# Patient Record
Sex: FEMALE | Race: WHITE | NOT HISPANIC OR LATINO
[De-identification: names, ages, dates, MRNs, and addresses within clinical notes are randomized per-mention and may not be internally consistent; named-entity substitution may affect disease eponyms.]

---

## 2023-06-05 ENCOUNTER — NON-APPOINTMENT (OUTPATIENT)
Age: 23
End: 2023-06-05

## 2023-06-05 ENCOUNTER — APPOINTMENT (OUTPATIENT)
Dept: FAMILY MEDICINE | Facility: CLINIC | Age: 23
End: 2023-06-05
Payer: COMMERCIAL

## 2023-06-05 VITALS
HEIGHT: 66 IN | HEART RATE: 88 BPM | BODY MASS INDEX: 27.48 KG/M2 | WEIGHT: 171 LBS | DIASTOLIC BLOOD PRESSURE: 82 MMHG | SYSTOLIC BLOOD PRESSURE: 128 MMHG | OXYGEN SATURATION: 98 %

## 2023-06-05 DIAGNOSIS — M54.50 LOW BACK PAIN, UNSPECIFIED: ICD-10-CM

## 2023-06-05 DIAGNOSIS — F12.90 CANNABIS USE, UNSPECIFIED, UNCOMPLICATED: ICD-10-CM

## 2023-06-05 DIAGNOSIS — Z82.49 FAMILY HISTORY OF ISCHEMIC HEART DISEASE AND OTHER DISEASES OF THE CIRCULATORY SYSTEM: ICD-10-CM

## 2023-06-05 DIAGNOSIS — J30.9 ALLERGIC RHINITIS, UNSPECIFIED: ICD-10-CM

## 2023-06-05 DIAGNOSIS — G89.29 LOW BACK PAIN, UNSPECIFIED: ICD-10-CM

## 2023-06-05 DIAGNOSIS — Z80.0 FAMILY HISTORY OF MALIGNANT NEOPLASM OF DIGESTIVE ORGANS: ICD-10-CM

## 2023-06-05 PROCEDURE — 99205 OFFICE O/P NEW HI 60 MIN: CPT

## 2023-06-05 RX ORDER — FLUTICASONE PROPIONATE 50 UG/1
50 SPRAY, METERED NASAL
Refills: 0 | Status: ACTIVE | COMMUNITY

## 2023-06-05 RX ORDER — FEXOFENADINE HCL 180 MG
180 TABLET ORAL
Refills: 0 | Status: ACTIVE | COMMUNITY

## 2023-06-05 NOTE — HISTORY OF PRESENT ILLNESS
[Parent] : parent [FreeTextEntry1] : Establish care and continue HRT [de-identified] : Anthony\par he/him\par Father, Vee, present at visit as well\par Came out as trans recently.  Behind schedule in transitioning from pediatrician and finding an adult doctor.\par Today on third dose of T.\par Meds/accessories include:  \par BD 3 ml syringe 18g 1.5"\par BD 1ml luer jacqui syringe\par BD needles 25g x 5/8 inch\par levono-e estrad 0.15/0.03/0.01 tabs\par Test cyp 200mg/ml\par 0.2ml weekly.  Last injection was today.\par Sertraline 50mg tab.  Had been on escitalopram from 2020 to last year.  Seemed to stop working--hard to get up in the morning.  Then switched in 12/2022 to sertraline.  Feels much better.   Sees Dr. August at [facility].  Has appt with new psychologist.\par Uses Allegra and Flonase prn for allergies.  Works well.\par Uses medical cannabis for back pain, depression, anxiety.\par Recognized gender difference around age 8-12.  Friend at [employer] came out as trans.  They all sat down and talked about Gina who is now Karan.  Told father that he didn't want to be a girl anymore.  Had clothing change in Spring 2021.  More masculine clothes . Identified as lesbian.  Then Sophomore year cut hair to short. Came out as non binary and was they/them.  Then starting going by Anthony in Nov 2022.  Conversation on names helped crystalize gender.  In the last few months, met friends who are gender non conforming.\par Last month, came out on birthday for a gender reveal party.\par Knew that he wanted top surgery.  Started raising money in Dec 2022.\par Wanted to appear more as man.  Body dysmorphia.  Frustrated at being misgendered.\par Magic wand: would change all organs in body to male organ.  Have a penis.  No breasts.\par Gets dysphoria mostly from breasts.  Binding doesn't fully compress. \par Wants to do chest surgery over December break.  Hasn't thought so much about bottom surgery (less dysphoria from groin).\par Likes T injections.  Only been three months.  Getting some acne.  No other negative changes.\par  [address] is pharmacy.\par

## 2023-06-15 ENCOUNTER — APPOINTMENT (OUTPATIENT)
Dept: INTERNAL MEDICINE | Facility: CLINIC | Age: 23
End: 2023-06-15
Payer: COMMERCIAL

## 2023-06-15 PROCEDURE — XXXXX: CPT | Mod: 1L

## 2023-06-30 LAB
ALBUMIN SERPL ELPH-MCNC: 4.7 G/DL
ALP BLD-CCNC: 76 U/L
ALT SERPL-CCNC: 13 U/L
ANION GAP SERPL CALC-SCNC: 14 MMOL/L
AST SERPL-CCNC: 16 U/L
BILIRUB SERPL-MCNC: 0.3 MG/DL
BUN SERPL-MCNC: 10 MG/DL
CALCIUM SERPL-MCNC: 9.8 MG/DL
CHLORIDE SERPL-SCNC: 108 MMOL/L
CHOLEST SERPL-MCNC: 165 MG/DL
CO2 SERPL-SCNC: 21 MMOL/L
CREAT SERPL-MCNC: 0.68 MG/DL
EGFR: 127 ML/MIN/1.73M2
ESTIMATED AVERAGE GLUCOSE: 114 MG/DL
ESTRADIOL SERPL-MCNC: 26 PG/ML
GLUCOSE SERPL-MCNC: 91 MG/DL
HBA1C MFR BLD HPLC: 5.6 %
HDLC SERPL-MCNC: 62 MG/DL
LDLC SERPL CALC-MCNC: 87 MG/DL
NONHDLC SERPL-MCNC: 103 MG/DL
POTASSIUM SERPL-SCNC: 4.4 MMOL/L
PROT SERPL-MCNC: 7.2 G/DL
SODIUM SERPL-SCNC: 142 MMOL/L
TESTOST FREE SERPL-MCNC: 6.8 PG/ML
TESTOST SERPL-MCNC: 179 NG/DL
TRIGL SERPL-MCNC: 80 MG/DL
TSH SERPL-ACNC: 2.22 UIU/ML

## 2023-08-03 ENCOUNTER — LABORATORY RESULT (OUTPATIENT)
Age: 23
End: 2023-08-03

## 2023-08-03 ENCOUNTER — APPOINTMENT (OUTPATIENT)
Dept: INTERNAL MEDICINE | Facility: CLINIC | Age: 23
End: 2023-08-03
Payer: COMMERCIAL

## 2023-08-03 PROCEDURE — XXXXX: CPT | Mod: 1L

## 2023-08-18 ENCOUNTER — APPOINTMENT (OUTPATIENT)
Dept: INTERNAL MEDICINE | Facility: CLINIC | Age: 23
End: 2023-08-18

## 2023-09-01 ENCOUNTER — APPOINTMENT (OUTPATIENT)
Dept: INTERNAL MEDICINE | Facility: CLINIC | Age: 23
End: 2023-09-01

## 2023-09-06 LAB
TESTOST FREE SERPL-MCNC: 14.2 PG/ML
TESTOST SERPL-MCNC: 502 NG/DL

## 2023-09-15 ENCOUNTER — APPOINTMENT (OUTPATIENT)
Dept: FAMILY MEDICINE | Facility: CLINIC | Age: 23
End: 2023-09-15
Payer: COMMERCIAL

## 2023-09-15 VITALS
BODY MASS INDEX: 28.28 KG/M2 | HEIGHT: 66 IN | OXYGEN SATURATION: 98 % | HEART RATE: 70 BPM | SYSTOLIC BLOOD PRESSURE: 100 MMHG | WEIGHT: 176 LBS | DIASTOLIC BLOOD PRESSURE: 78 MMHG

## 2023-09-15 PROCEDURE — 99214 OFFICE O/P EST MOD 30 MIN: CPT

## 2023-11-03 RX ORDER — ISOPROPYL ALCOHOL 700 MG/ML
70 CLOTH TOPICAL
Qty: 100 | Refills: 11 | Status: ACTIVE | COMMUNITY
Start: 2023-11-03 | End: 1900-01-01

## 2023-11-03 RX ORDER — NEEDLES, DISPOSABLE 25GX5/8"
18G X 1" NEEDLE, DISPOSABLE MISCELLANEOUS
Qty: 20 | Refills: 11 | Status: ACTIVE | COMMUNITY
Start: 1900-01-01 | End: 1900-01-01

## 2023-11-03 RX ORDER — SYRINGE, DISPOSABLE, 1 ML
1 ML SYRINGE, EMPTY DISPOSABLE MISCELLANEOUS
Qty: 20 | Refills: 11 | Status: ACTIVE | COMMUNITY
Start: 1900-01-01 | End: 1900-01-01

## 2023-11-22 ENCOUNTER — RX RENEWAL (OUTPATIENT)
Age: 23
End: 2023-11-22

## 2024-01-04 ENCOUNTER — APPOINTMENT (OUTPATIENT)
Dept: FAMILY MEDICINE | Facility: CLINIC | Age: 24
End: 2024-01-04
Payer: COMMERCIAL

## 2024-01-04 VITALS
BODY MASS INDEX: 28.53 KG/M2 | DIASTOLIC BLOOD PRESSURE: 76 MMHG | OXYGEN SATURATION: 98 % | SYSTOLIC BLOOD PRESSURE: 124 MMHG | WEIGHT: 177.5 LBS | HEART RATE: 74 BPM | HEIGHT: 66 IN

## 2024-01-04 PROCEDURE — 36415 COLL VENOUS BLD VENIPUNCTURE: CPT

## 2024-01-04 PROCEDURE — 99214 OFFICE O/P EST MOD 30 MIN: CPT | Mod: 25

## 2024-01-04 PROCEDURE — G2211 COMPLEX E/M VISIT ADD ON: CPT

## 2024-01-04 RX ORDER — BENZOYL PEROXIDE 5 G/100G
5 GEL TOPICAL DAILY
Qty: 1 | Refills: 11 | Status: ACTIVE | COMMUNITY
Start: 2024-01-04 | End: 1900-01-01

## 2024-01-04 NOTE — HISTORY OF PRESENT ILLNESS
[FreeTextEntry1] : Follow up on med [de-identified] : Anthony He/him Pt here for follow up.  Needs T refill. Using T.  Seeing positive changes.  More exercise--goes to gym five times a week.  Helps with mental health.  Weights and some cardio. Some facial hair at sideburns and under chin and mustache.  Voice has dropped a lot.  Body contouring has changed.  Good muscle growth.  No bleeding from down below. Getting acne.  Using otc products.  Mostly on face.  Sometimes on shoulders.  No injection site problems except once got some bruising. Interested in upping dose of the T.  Last injection was 3 days ago.  Once a week.  No missed shots in the last month.

## 2024-01-04 NOTE — PHYSICAL EXAM
[Normal] : no rash [de-identified] : Some scattered acne on face; some hair growth on upper lip and sideburns

## 2024-01-12 LAB
ALBUMIN SERPL ELPH-MCNC: 5 G/DL
ALP BLD-CCNC: 76 U/L
ALT SERPL-CCNC: 18 U/L
ANION GAP SERPL CALC-SCNC: 13 MMOL/L
AST SERPL-CCNC: 24 U/L
BASOPHILS # BLD AUTO: 0.05 K/UL
BASOPHILS NFR BLD AUTO: 0.8 %
BILIRUB SERPL-MCNC: 0.8 MG/DL
BUN SERPL-MCNC: 10 MG/DL
CALCIUM SERPL-MCNC: 9.8 MG/DL
CHLORIDE SERPL-SCNC: 103 MMOL/L
CO2 SERPL-SCNC: 25 MMOL/L
CREAT SERPL-MCNC: 0.75 MG/DL
EGFR: 116 ML/MIN/1.73M2
EOSINOPHIL # BLD AUTO: 0.14 K/UL
EOSINOPHIL NFR BLD AUTO: 2.3 %
ESTRADIOL SERPL-MCNC: 39 PG/ML
GLUCOSE SERPL-MCNC: 87 MG/DL
HCT VFR BLD CALC: 47.6 %
HGB BLD-MCNC: 14.7 G/DL
IMM GRANULOCYTES NFR BLD AUTO: 0.3 %
LYMPHOCYTES # BLD AUTO: 2.32 K/UL
LYMPHOCYTES NFR BLD AUTO: 37.7 %
MAN DIFF?: NORMAL
MCHC RBC-ENTMCNC: 25.4 PG
MCHC RBC-ENTMCNC: 30.9 GM/DL
MCV RBC AUTO: 82.2 FL
MONOCYTES # BLD AUTO: 0.46 K/UL
MONOCYTES NFR BLD AUTO: 7.5 %
NEUTROPHILS # BLD AUTO: 3.17 K/UL
NEUTROPHILS NFR BLD AUTO: 51.4 %
PLATELET # BLD AUTO: 363 K/UL
POTASSIUM SERPL-SCNC: 4.3 MMOL/L
PROT SERPL-MCNC: 7.2 G/DL
RBC # BLD: 5.79 M/UL
RBC # FLD: 15 %
SODIUM SERPL-SCNC: 141 MMOL/L
TESTOST FREE SERPL-MCNC: 11.6 PG/ML
TESTOST SERPL-MCNC: 380 NG/DL
WBC # FLD AUTO: 6.16 K/UL

## 2024-02-06 ENCOUNTER — RX RENEWAL (OUTPATIENT)
Age: 24
End: 2024-02-06

## 2024-03-07 ENCOUNTER — APPOINTMENT (OUTPATIENT)
Dept: FAMILY MEDICINE | Facility: CLINIC | Age: 24
End: 2024-03-07
Payer: COMMERCIAL

## 2024-03-07 VITALS
HEART RATE: 68 BPM | OXYGEN SATURATION: 97 % | WEIGHT: 179 LBS | BODY MASS INDEX: 28.77 KG/M2 | SYSTOLIC BLOOD PRESSURE: 120 MMHG | DIASTOLIC BLOOD PRESSURE: 70 MMHG | HEIGHT: 66 IN

## 2024-03-07 DIAGNOSIS — L70.0 ACNE VULGARIS: ICD-10-CM

## 2024-03-07 DIAGNOSIS — F64.9 GENDER IDENTITY DISORDER, UNSPECIFIED: ICD-10-CM

## 2024-03-07 PROCEDURE — G2211 COMPLEX E/M VISIT ADD ON: CPT

## 2024-03-07 PROCEDURE — 36415 COLL VENOUS BLD VENIPUNCTURE: CPT

## 2024-03-07 PROCEDURE — 99214 OFFICE O/P EST MOD 30 MIN: CPT

## 2024-03-07 RX ORDER — DOXYCYCLINE 100 MG/1
100 CAPSULE ORAL
Qty: 90 | Refills: 1 | Status: ACTIVE | COMMUNITY
Start: 2024-03-07 | End: 1900-01-01

## 2024-03-07 RX ORDER — NEEDLES, DISPOSABLE 25GX5/8"
25G X 5/8" NEEDLE, DISPOSABLE MISCELLANEOUS
Qty: 20 | Refills: 11 | Status: ACTIVE | COMMUNITY
Start: 1900-01-01 | End: 1900-01-01

## 2024-03-07 NOTE — HISTORY OF PRESENT ILLNESS
[de-identified] : Anthony He/him At college right now.  Very busy.  Final year and lots of classes, jobs, and projects.  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  Getting masters in communication.  Working for [employer] team. Health has been good. Facial hair growing much faster.  Learning how to shave it. Acne has been better and worse.  Worse at end of T cycle.  Using topical  Increased T from 0.3 to 0.4ml.  Doesn't necessarily feel any different in any bad way.  Likes the dose.  Hungrier.  Goes to gym frequently.  Weight about the same but clearly more muscle.  Happy with dose.  Feels like good masculinization Got COVID and flu shot in the fall. Taking sertraline.  Feels really stable.  Likes mood.  Missed a pill and felt negative.  Wants to continue on current dose.   [FreeTextEntry1] : Follow up on meds

## 2024-03-07 NOTE — HEALTH RISK ASSESSMENT
[1 or 2 (0 pts)] : 1 or 2 (0 points) [2 - 4 times a month (2 pts)] : 2-4 times a month (2 points) [Yes] : In the past 12 months have you used drugs other than those required for medical reasons? Yes [Never (0 pts)] : Never (0 points) [No falls in past year] : Patient reported no falls in the past year [0] : 2) Feeling down, depressed, or hopeless: Not at all (0) [Never] : Never [de-identified] : medical [place] [de-identified] : gym 5 days a week [de-identified] : normal

## 2024-03-07 NOTE — PHYSICAL EXAM
[Normal] : no carotid or abdominal bruits heard, no varicosities, pedal pulses are present, no peripheral edema, no extremity clubbing or cyanosis and no palpable aorta [de-identified] : Macular acne across face, upper back, and upper arms

## 2024-03-12 LAB
ALBUMIN SERPL ELPH-MCNC: 4.6 G/DL
ALP BLD-CCNC: 76 U/L
ALT SERPL-CCNC: 16 U/L
ANION GAP SERPL CALC-SCNC: 14 MMOL/L
AST SERPL-CCNC: 17 U/L
BILIRUB SERPL-MCNC: 0.6 MG/DL
BUN SERPL-MCNC: 9 MG/DL
CALCIUM SERPL-MCNC: 9.5 MG/DL
CHLORIDE SERPL-SCNC: 104 MMOL/L
CO2 SERPL-SCNC: 23 MMOL/L
CREAT SERPL-MCNC: 0.74 MG/DL
EGFR: 118 ML/MIN/1.73M2
ESTRADIOL SERPL-MCNC: 32 PG/ML
GLUCOSE SERPL-MCNC: 86 MG/DL
HCT VFR BLD CALC: 48.8 %
HGB BLD-MCNC: 15.3 G/DL
MCHC RBC-ENTMCNC: 26.1 PG
MCHC RBC-ENTMCNC: 31.4 GM/DL
MCV RBC AUTO: 83.1 FL
PLATELET # BLD AUTO: 356 K/UL
POTASSIUM SERPL-SCNC: 4.8 MMOL/L
PROT SERPL-MCNC: 6.9 G/DL
RBC # BLD: 5.87 M/UL
RBC # FLD: 14 %
SODIUM SERPL-SCNC: 141 MMOL/L
TESTOST SERPL-MCNC: 494 NG/DL
WBC # FLD AUTO: 5.95 K/UL

## 2024-06-07 ENCOUNTER — APPOINTMENT (OUTPATIENT)
Dept: FAMILY MEDICINE | Facility: CLINIC | Age: 24
End: 2024-06-07
Payer: COMMERCIAL

## 2024-06-07 VITALS
TEMPERATURE: 97.9 F | HEIGHT: 66 IN | WEIGHT: 173 LBS | DIASTOLIC BLOOD PRESSURE: 88 MMHG | SYSTOLIC BLOOD PRESSURE: 128 MMHG | BODY MASS INDEX: 27.8 KG/M2

## 2024-06-07 DIAGNOSIS — E34.9 ENDOCRINE DISORDER, UNSPECIFIED: ICD-10-CM

## 2024-06-07 DIAGNOSIS — F41.9 ANXIETY DISORDER, UNSPECIFIED: ICD-10-CM

## 2024-06-07 DIAGNOSIS — F32.A ANXIETY DISORDER, UNSPECIFIED: ICD-10-CM

## 2024-06-07 PROCEDURE — G2211 COMPLEX E/M VISIT ADD ON: CPT

## 2024-06-07 PROCEDURE — 99214 OFFICE O/P EST MOD 30 MIN: CPT

## 2024-06-07 PROCEDURE — 36415 COLL VENOUS BLD VENIPUNCTURE: CPT

## 2024-06-07 RX ORDER — SERTRALINE HYDROCHLORIDE 100 MG/1
100 TABLET, FILM COATED ORAL
Qty: 135 | Refills: 1 | Status: ACTIVE | COMMUNITY
Start: 1900-01-01 | End: 1900-01-01

## 2024-06-07 RX ORDER — TESTOSTERONE CYPIONATE 200 MG/ML
200 INJECTION, SOLUTION INTRAMUSCULAR
Qty: 6 | Refills: 0 | Status: ACTIVE | COMMUNITY
Start: 1900-01-01 | End: 1900-01-01

## 2024-06-07 NOTE — HEALTH RISK ASSESSMENT
[Yes] : Yes [2 - 4 times a month (2 pts)] : 2-4 times a month (2 points) [1 or 2 (0 pts)] : 1 or 2 (0 points) [Never (0 pts)] : Never (0 points) [0] : 2) Feeling down, depressed, or hopeless: Not at all (0) [PHQ-2 Negative - No further assessment needed] : PHQ-2 Negative - No further assessment needed [Never] : Never [Audit-CScore] : 2 [[id number]] : 0

## 2024-06-07 NOTE — HISTORY OF PRESENT ILLNESS
[FreeTextEntry1] : hormone [de-identified] : 24 year old F present for hormone f/u  Anthony is here with his gf. Feeling well today - no other acute concerns. Hormone: last injection Monday - feeling its "burden" to inject weekly; considering q2week inj, or topical  Anxiety: taking sertraline 100mg want in increase 150nmg daily; seeing theraptist  Needs refill on meds Agreeable for labs

## 2024-06-07 NOTE — PHYSICAL EXAM
[Normal Sclera/Conjunctiva] : normal sclera/conjunctiva [PERRL] : pupils equal round and reactive to light [EOMI] : extraocular movements intact [Normal Outer Ear/Nose] : the outer ears and nose were normal in appearance [Normal Oropharynx] : the oropharynx was normal [Normal TMs] : both tympanic membranes were normal [No Lymphadenopathy] : no lymphadenopathy [Supple] : supple [No Respiratory Distress] : no respiratory distress  [No Accessory Muscle Use] : no accessory muscle use [Clear to Auscultation] : lungs were clear to auscultation bilaterally [Normal Rate] : normal rate  [Regular Rhythm] : with a regular rhythm [No Edema] : there was no peripheral edema [Soft] : abdomen soft [Non Tender] : non-tender [Non-distended] : non-distended [Normal Bowel Sounds] : normal bowel sounds [No Rash] : no rash [Coordination Grossly Intact] : coordination grossly intact [No Focal Deficits] : no focal deficits [Normal Gait] : normal gait [Normal] : affect was normal and insight and judgment were intact [de-identified] : left forearm burn covered in wrap.

## 2024-06-07 NOTE — REVIEW OF SYSTEMS
[Fever] : no fever [Fatigue] : no fatigue [Night Sweats] : no night sweats [Earache] : no earache [Chest Pain] : no chest pain [Palpitations] : no palpitations [Shortness Of Breath] : no shortness of breath [Wheezing] : no wheezing [Cough] : no cough [Abdominal Pain] : no abdominal pain [Nausea] : no nausea [Vomiting] : no vomiting [Headache] : no headache

## 2024-06-10 DIAGNOSIS — Z00.00 ENCOUNTER FOR GENERAL ADULT MEDICAL EXAMINATION W/OUT ABNORMAL FINDINGS: ICD-10-CM

## 2024-06-10 LAB
ALBUMIN SERPL ELPH-MCNC: 4.5 G/DL
ALP BLD-CCNC: 77 U/L
ALT SERPL-CCNC: 16 U/L
ANION GAP SERPL CALC-SCNC: 16 MMOL/L
AST SERPL-CCNC: 30 U/L
BASOPHILS # BLD AUTO: 0.03 K/UL
BASOPHILS NFR BLD AUTO: 0.5 %
BILIRUB SERPL-MCNC: 0.7 MG/DL
BUN SERPL-MCNC: 9 MG/DL
CALCIUM SERPL-MCNC: 9.5 MG/DL
CHLORIDE SERPL-SCNC: 102 MMOL/L
CO2 SERPL-SCNC: 18 MMOL/L
CREAT SERPL-MCNC: 0.75 MG/DL
EGFR: 115 ML/MIN/1.73M2
EOSINOPHIL # BLD AUTO: 0.13 K/UL
EOSINOPHIL NFR BLD AUTO: 2.2 %
ESTRADIOL SERPL-MCNC: 28 PG/ML
GLUCOSE SERPL-MCNC: 77 MG/DL
HCT VFR BLD CALC: 50.1 %
HGB BLD-MCNC: 15.4 G/DL
IMM GRANULOCYTES NFR BLD AUTO: 0.3 %
LYMPHOCYTES # BLD AUTO: 2.17 K/UL
LYMPHOCYTES NFR BLD AUTO: 37.3 %
MAN DIFF?: NORMAL
MCHC RBC-ENTMCNC: 26.3 PG
MCHC RBC-ENTMCNC: 30.7 GM/DL
MCV RBC AUTO: 85.5 FL
MONOCYTES # BLD AUTO: 0.43 K/UL
MONOCYTES NFR BLD AUTO: 7.4 %
NEUTROPHILS # BLD AUTO: 3.03 K/UL
NEUTROPHILS NFR BLD AUTO: 52.3 %
PLATELET # BLD AUTO: 318 K/UL
POTASSIUM SERPL-SCNC: 6 MMOL/L
PROT SERPL-MCNC: 7.2 G/DL
RBC # BLD: 5.86 M/UL
RBC # FLD: 14.3 %
SODIUM SERPL-SCNC: 136 MMOL/L
WBC # FLD AUTO: 5.81 K/UL

## 2024-06-12 LAB
TESTOST FREE SERPL-MCNC: 11.7 PG/ML
TESTOST SERPL-MCNC: 530 NG/DL

## 2024-07-12 ENCOUNTER — APPOINTMENT (OUTPATIENT)
Dept: INTERNAL MEDICINE | Facility: CLINIC | Age: 24
End: 2024-07-12
Payer: COMMERCIAL

## 2024-07-12 PROCEDURE — 36415 COLL VENOUS BLD VENIPUNCTURE: CPT

## 2024-09-06 ENCOUNTER — RX RENEWAL (OUTPATIENT)
Age: 24
End: 2024-09-06

## 2024-10-14 ENCOUNTER — APPOINTMENT (OUTPATIENT)
Dept: FAMILY MEDICINE | Facility: CLINIC | Age: 24
End: 2024-10-14
Payer: COMMERCIAL

## 2024-10-14 VITALS
RESPIRATION RATE: 18 BRPM | SYSTOLIC BLOOD PRESSURE: 120 MMHG | OXYGEN SATURATION: 98 % | WEIGHT: 166 LBS | HEART RATE: 63 BPM | BODY MASS INDEX: 26.68 KG/M2 | HEIGHT: 66 IN | DIASTOLIC BLOOD PRESSURE: 62 MMHG

## 2024-10-14 DIAGNOSIS — L70.0 ACNE VULGARIS: ICD-10-CM

## 2024-10-14 DIAGNOSIS — F41.9 ANXIETY DISORDER, UNSPECIFIED: ICD-10-CM

## 2024-10-14 DIAGNOSIS — F64.9 GENDER IDENTITY DISORDER, UNSPECIFIED: ICD-10-CM

## 2024-10-14 DIAGNOSIS — E34.9 ENDOCRINE DISORDER, UNSPECIFIED: ICD-10-CM

## 2024-10-14 DIAGNOSIS — F32.A ANXIETY DISORDER, UNSPECIFIED: ICD-10-CM

## 2024-10-14 PROCEDURE — G2211 COMPLEX E/M VISIT ADD ON: CPT | Mod: NC

## 2024-10-14 PROCEDURE — 99214 OFFICE O/P EST MOD 30 MIN: CPT

## 2024-10-14 PROCEDURE — 36415 COLL VENOUS BLD VENIPUNCTURE: CPT

## 2024-10-14 RX ORDER — TRETINOIN 0.5 MG/G
0.05 CREAM TOPICAL
Qty: 1 | Refills: 11 | Status: ACTIVE | COMMUNITY
Start: 2024-10-14 | End: 1900-01-01

## 2024-10-21 ENCOUNTER — RX RENEWAL (OUTPATIENT)
Age: 24
End: 2024-10-21

## 2024-10-24 ENCOUNTER — NON-APPOINTMENT (OUTPATIENT)
Age: 24
End: 2024-10-24

## 2024-11-13 ENCOUNTER — APPOINTMENT (OUTPATIENT)
Dept: PLASTIC SURGERY | Facility: CLINIC | Age: 24
End: 2024-11-13
Payer: COMMERCIAL

## 2024-11-13 VITALS
SYSTOLIC BLOOD PRESSURE: 121 MMHG | TEMPERATURE: 98 F | DIASTOLIC BLOOD PRESSURE: 75 MMHG | WEIGHT: 165 LBS | BODY MASS INDEX: 26.52 KG/M2 | HEART RATE: 65 BPM | HEIGHT: 66 IN

## 2024-11-13 DIAGNOSIS — Z78.9 OTHER SPECIFIED HEALTH STATUS: ICD-10-CM

## 2024-11-13 DIAGNOSIS — F98.8 OTHER SPECIFIED BEHAVIORAL AND EMOTIONAL DISORDERS WITH ONSET USUALLY OCCURRING IN CHILDHOOD AND ADOLESCENCE: ICD-10-CM

## 2024-11-13 DIAGNOSIS — F64.0 TRANSSEXUALISM: ICD-10-CM

## 2024-11-13 PROCEDURE — 99204 OFFICE O/P NEW MOD 45 MIN: CPT

## 2024-12-03 ENCOUNTER — RX RENEWAL (OUTPATIENT)
Age: 24
End: 2024-12-03

## 2025-01-06 ENCOUNTER — APPOINTMENT (OUTPATIENT)
Dept: FAMILY MEDICINE | Facility: CLINIC | Age: 25
End: 2025-01-06
Payer: COMMERCIAL

## 2025-01-06 VITALS
OXYGEN SATURATION: 98 % | SYSTOLIC BLOOD PRESSURE: 120 MMHG | BODY MASS INDEX: 27.16 KG/M2 | DIASTOLIC BLOOD PRESSURE: 80 MMHG | HEART RATE: 78 BPM | HEIGHT: 66 IN | WEIGHT: 169 LBS

## 2025-01-06 DIAGNOSIS — L70.0 ACNE VULGARIS: ICD-10-CM

## 2025-01-06 DIAGNOSIS — E34.9 ENDOCRINE DISORDER, UNSPECIFIED: ICD-10-CM

## 2025-01-06 DIAGNOSIS — F64.9 GENDER IDENTITY DISORDER, UNSPECIFIED: ICD-10-CM

## 2025-01-06 DIAGNOSIS — Z00.00 ENCOUNTER FOR GENERAL ADULT MEDICAL EXAMINATION W/OUT ABNORMAL FINDINGS: ICD-10-CM

## 2025-01-06 DIAGNOSIS — F32.A ANXIETY DISORDER, UNSPECIFIED: ICD-10-CM

## 2025-01-06 DIAGNOSIS — F41.9 ANXIETY DISORDER, UNSPECIFIED: ICD-10-CM

## 2025-01-06 DIAGNOSIS — J30.9 ALLERGIC RHINITIS, UNSPECIFIED: ICD-10-CM

## 2025-01-06 DIAGNOSIS — Z11.3 ENCOUNTER FOR SCREENING FOR INFECTIONS WITH A PREDOMINANTLY SEXUAL MODE OF TRANSMISSION: ICD-10-CM

## 2025-01-06 PROCEDURE — 36415 COLL VENOUS BLD VENIPUNCTURE: CPT

## 2025-01-06 PROCEDURE — 99395 PREV VISIT EST AGE 18-39: CPT

## 2025-01-06 PROCEDURE — G0444 DEPRESSION SCREEN ANNUAL: CPT | Mod: 59

## 2025-01-10 LAB
ALBUMIN SERPL ELPH-MCNC: 4.6 G/DL
ALP BLD-CCNC: 86 U/L
ALT SERPL-CCNC: 14 U/L
ANION GAP SERPL CALC-SCNC: 16 MMOL/L
AST SERPL-CCNC: 15 U/L
BASOPHILS # BLD AUTO: 0.03 K/UL
BASOPHILS NFR BLD AUTO: 0.5 %
BILIRUB SERPL-MCNC: 0.8 MG/DL
BUN SERPL-MCNC: 11 MG/DL
C TRACH RRNA SPEC QL NAA+PROBE: NOT DETECTED
CALCIUM SERPL-MCNC: 10.2 MG/DL
CHLORIDE SERPL-SCNC: 102 MMOL/L
CHOLEST SERPL-MCNC: 143 MG/DL
CO2 SERPL-SCNC: 25 MMOL/L
CREAT SERPL-MCNC: 0.93 MG/DL
EGFR: 89 ML/MIN/1.73M2
EOSINOPHIL # BLD AUTO: 0.14 K/UL
EOSINOPHIL NFR BLD AUTO: 2.2 %
ESTRADIOL SERPL-MCNC: 22 PG/ML
GLUCOSE SERPL-MCNC: 88 MG/DL
HCT VFR BLD CALC: 50.7 %
HCV AB SER QL: NONREACTIVE
HCV S/CO RATIO: 0.13 S/CO
HDLC SERPL-MCNC: 45 MG/DL
HGB BLD-MCNC: 15.6 G/DL
HIV1+2 AB SPEC QL IA.RAPID: NONREACTIVE
IMM GRANULOCYTES NFR BLD AUTO: 0.3 %
LDLC SERPL CALC-MCNC: 70 MG/DL
LYMPHOCYTES # BLD AUTO: 2.14 K/UL
LYMPHOCYTES NFR BLD AUTO: 33.3 %
MAN DIFF?: NORMAL
MCHC RBC-ENTMCNC: 26.4 PG
MCHC RBC-ENTMCNC: 30.8 G/DL
MCV RBC AUTO: 85.8 FL
MONOCYTES # BLD AUTO: 0.48 K/UL
MONOCYTES NFR BLD AUTO: 7.5 %
N GONORRHOEA RRNA SPEC QL NAA+PROBE: NOT DETECTED
NEUTROPHILS # BLD AUTO: 3.62 K/UL
NEUTROPHILS NFR BLD AUTO: 56.2 %
NONHDLC SERPL-MCNC: 97 MG/DL
PLATELET # BLD AUTO: 409 K/UL
POTASSIUM SERPL-SCNC: 5.2 MMOL/L
PROT SERPL-MCNC: 7.1 G/DL
RBC # BLD: 5.91 M/UL
RBC # FLD: 13 %
SODIUM SERPL-SCNC: 143 MMOL/L
SOURCE AMPLIFICATION: NORMAL
TESTOST SERPL-MCNC: 435 NG/DL
TRIGL SERPL-MCNC: 158 MG/DL
WBC # FLD AUTO: 6.43 K/UL

## 2025-04-07 ENCOUNTER — APPOINTMENT (OUTPATIENT)
Dept: FAMILY MEDICINE | Facility: CLINIC | Age: 25
End: 2025-04-07
Payer: COMMERCIAL

## 2025-04-07 VITALS
BODY MASS INDEX: 27.32 KG/M2 | HEART RATE: 72 BPM | HEIGHT: 65.75 IN | WEIGHT: 168 LBS | DIASTOLIC BLOOD PRESSURE: 70 MMHG | OXYGEN SATURATION: 98 % | SYSTOLIC BLOOD PRESSURE: 104 MMHG

## 2025-04-07 DIAGNOSIS — F41.9 ANXIETY DISORDER, UNSPECIFIED: ICD-10-CM

## 2025-04-07 DIAGNOSIS — E34.9 ENDOCRINE DISORDER, UNSPECIFIED: ICD-10-CM

## 2025-04-07 DIAGNOSIS — L70.0 ACNE VULGARIS: ICD-10-CM

## 2025-04-07 DIAGNOSIS — F32.A ANXIETY DISORDER, UNSPECIFIED: ICD-10-CM

## 2025-04-07 PROCEDURE — 36415 COLL VENOUS BLD VENIPUNCTURE: CPT

## 2025-04-07 PROCEDURE — 99214 OFFICE O/P EST MOD 30 MIN: CPT

## 2025-04-07 PROCEDURE — G2211 COMPLEX E/M VISIT ADD ON: CPT | Mod: NC

## 2025-04-15 ENCOUNTER — APPOINTMENT (OUTPATIENT)
Dept: INTERNAL MEDICINE | Facility: CLINIC | Age: 25
End: 2025-04-15

## 2025-04-15 PROCEDURE — 36415 COLL VENOUS BLD VENIPUNCTURE: CPT

## 2025-04-16 LAB
ALBUMIN SERPL ELPH-MCNC: 4.7 G/DL
ALP BLD-CCNC: 74 U/L
ALT SERPL-CCNC: 17 U/L
ANION GAP SERPL CALC-SCNC: 14 MMOL/L
AST SERPL-CCNC: 16 U/L
BILIRUB SERPL-MCNC: 0.7 MG/DL
BUN SERPL-MCNC: 10 MG/DL
CALCIUM SERPL-MCNC: 9.9 MG/DL
CHLORIDE SERPL-SCNC: 103 MMOL/L
CO2 SERPL-SCNC: 24 MMOL/L
CREAT SERPL-MCNC: 0.84 MG/DL
EGFRCR SERPLBLD CKD-EPI 2021: 101 ML/MIN/1.73M2
ESTRADIOL SERPL-MCNC: 37 PG/ML
GLUCOSE SERPL-MCNC: 107 MG/DL
HCT VFR BLD CALC: 52.1 %
HGB BLD-MCNC: 16.2 G/DL
MCHC RBC-ENTMCNC: 26.9 PG
MCHC RBC-ENTMCNC: 31.1 G/DL
MCV RBC AUTO: 86.5 FL
PLATELET # BLD AUTO: 324 K/UL
POTASSIUM SERPL-SCNC: 4.8 MMOL/L
PROT SERPL-MCNC: 7.1 G/DL
RBC # BLD: 6.02 M/UL
RBC # FLD: 13 %
SODIUM SERPL-SCNC: 141 MMOL/L
TESTOST SERPL-MCNC: 782 NG/DL
WBC # FLD AUTO: 6.85 K/UL

## 2025-06-11 ENCOUNTER — RX RENEWAL (OUTPATIENT)
Age: 25
End: 2025-06-11

## 2025-07-07 ENCOUNTER — APPOINTMENT (OUTPATIENT)
Dept: FAMILY MEDICINE | Facility: CLINIC | Age: 25
End: 2025-07-07
Payer: SELF-PAY

## 2025-07-07 VITALS
DIASTOLIC BLOOD PRESSURE: 60 MMHG | HEIGHT: 66 IN | TEMPERATURE: 97.1 F | SYSTOLIC BLOOD PRESSURE: 118 MMHG | BODY MASS INDEX: 27.32 KG/M2 | WEIGHT: 170 LBS | HEART RATE: 67 BPM | OXYGEN SATURATION: 97 %

## 2025-07-07 PROCEDURE — 99214 OFFICE O/P EST MOD 30 MIN: CPT

## 2025-07-07 PROCEDURE — G2211 COMPLEX E/M VISIT ADD ON: CPT

## 2025-07-07 PROCEDURE — 36415 COLL VENOUS BLD VENIPUNCTURE: CPT

## 2025-07-07 RX ORDER — FINASTERIDE 1 MG/1
1 TABLET ORAL DAILY
Qty: 90 | Refills: 1 | Status: ACTIVE | COMMUNITY
Start: 2025-07-07 | End: 1900-01-01